# Patient Record
Sex: MALE | Race: WHITE | NOT HISPANIC OR LATINO | URBAN - METROPOLITAN AREA
[De-identification: names, ages, dates, MRNs, and addresses within clinical notes are randomized per-mention and may not be internally consistent; named-entity substitution may affect disease eponyms.]

---

## 2017-10-30 NOTE — PATIENT DISCUSSION
(H25.13) Age-related nuclear cataract, bilateral - Assesment : Examination revealed cataracts in both eyes. Examination revealed moderate senile nuclear cataract. Patient is functioning reasonably well with minor symptoms. - Plan : Monitor for changes.

## 2017-10-30 NOTE — PATIENT DISCUSSION
(W42.3697) Nonexudative age-related macular degeneration bilateral lyric - Assesment : Examination revealed AMD Dry. - Plan : Wear sunglasses when outdoors and eat green, leafy vegetables to maintain ocular health.   Return to clinic in six months for Macular Fundus Photos and Exam

## 2017-10-30 NOTE — PATIENT DISCUSSION
(H40.053) Ocular hypertension, bilateral - Assesment : Examination revealed OHT mild. Iop within normal range today, recommend observation,if it trends treatment will be needed. - Plan : Monitor for changes. Advised patient to call our office when decreased vision or increased eye pain.

## 2018-02-05 ENCOUNTER — IMPORTED ENCOUNTER (OUTPATIENT)
Dept: URBAN - METROPOLITAN AREA CLINIC 50 | Facility: CLINIC | Age: 68
End: 2018-02-05

## 2018-02-07 ENCOUNTER — IMPORTED ENCOUNTER (OUTPATIENT)
Dept: URBAN - METROPOLITAN AREA CLINIC 50 | Facility: CLINIC | Age: 68
End: 2018-02-07

## 2018-03-13 ENCOUNTER — IMPORTED ENCOUNTER (OUTPATIENT)
Dept: URBAN - METROPOLITAN AREA CLINIC 50 | Facility: CLINIC | Age: 68
End: 2018-03-13

## 2018-03-21 ENCOUNTER — IMPORTED ENCOUNTER (OUTPATIENT)
Dept: URBAN - METROPOLITAN AREA CLINIC 50 | Facility: CLINIC | Age: 68
End: 2018-03-21

## 2018-03-29 ENCOUNTER — IMPORTED ENCOUNTER (OUTPATIENT)
Dept: URBAN - METROPOLITAN AREA CLINIC 50 | Facility: CLINIC | Age: 68
End: 2018-03-29

## 2018-04-30 NOTE — PATIENT DISCUSSION
(E16.6361) Nonexudative age-related macular degeneration bilateral lyric - Assesment : Examination revealed AMD Dry.-MILD - Plan : Monitor for changes. Advised patient to call our office with decreased vision or increased distortion. Patient advised to check Amsler Grid regularly (once weekly or more) and use nutraceuticals such as AREDS 2 eye vitamins. Wear sunglasses when outdoors and eat green, leafy vegetables to maintain ocular health.

## 2018-04-30 NOTE — PATIENT DISCUSSION
(H35.363) Drusen (degenerative) of macula, bilateral - Assesment : Examination revealed Drusen. - FINE HARD DRUSEN CENTRAL MACULA - Plan : Monitor for changes. Advised patient to call our office with decreased vision or increased symptoms.

## 2018-04-30 NOTE — PATIENT DISCUSSION
(H25.13) Age-related nuclear cataract, bilateral - Assesment : Examination revealed cataracts in both eyes. OS>OD. PATIENT MAY PROCEED WITH CATARACT SURGERY AT ANYTIME. - Plan : Monitor for changes.

## 2018-11-08 NOTE — PATIENT DISCUSSION
(H25.13) Age-related nuclear cataract, bilateral - Assesment : Examination revealed cataracts in both eyes. OS>OD. PATIENT MAY PROCEED WITH CATARACT SURGERY AT ANYTIME. ADVISED PATIENT CATARACTS AND ARMD ARE CONTRIBUTING TO BLUR OU. - Plan : SX COUNSELING.

## 2018-11-08 NOTE — PATIENT DISCUSSION
(C79.1828) Nonexudative age-related macular degeneration bilateral lyric - Assesment : Examination revealed AMD Dry.-MILD  NO FLUID OR EDEMA OU TODAY  CENTRAL RPE CHANGES WITH HARD DRUSEN OU. - Plan : Monitor for changes. Advised patient to call our office with decreased vision or increased distortion. Patient advised to check Amsler Grid regularly (once weekly or more) and use nutraceuticals such as AREDS 2 eye vitamins.  Wear sunglasses when outdoors and eat green, leafy vegetables to maintain ocular health. 6 MONTH/ DILATION/ FUNDUS PHOTOS

## 2019-01-03 NOTE — PATIENT DISCUSSION
(T31.5550) Nonexudative age-related macular degeneration bilateral lyric - Assesment : Examination revealed AMD Dry.-MILD  CENTRAL RPE CHANGES WITH HARD DRUSEN OU. - Plan : Monitor for changes. Advised patient to call our office with decreased vision or increased distortion. Patient advised to check Amsler Grid regularly (once weekly or more) and use nutraceuticals such as AREDS 2 eye vitamins. Wear sunglasses when outdoors and eat green, leafy vegetables to maintain ocular health.  KEEP MAY DILATION/ FUNDUS PHOTOS

## 2019-01-03 NOTE — PATIENT DISCUSSION
(H25.13) Age-related nuclear cataract, bilateral - Assesment : Examination revealed cataracts in both eyes. OS>OD.  **H/O ZOSTER WITH IRITIS OD. 20+ YEARS AGO  ADVISED PATIENT CATARACTS AND ARMD ARE CONTRIBUTING TO BLUR OU. CURRENTLY WEARS GLASSES FULL TIME. PT IS ABLE TO SEE SOME READING MATERIAL WITHOUT GLASSES HOWEVER SHE NEVER TAKES GLASSES HER GLASSES OFF DESPITE ABILITY TO SEE THINGS UP CLOSE WITHOUT GLASSES. PATIENT STATED SHE WOULD LIKE TO FOCUS OU AT [address]. STRESSED TO PT SHE'S CURRENTLY NEARSIGHTED WHICH ALLOWS HER TO SEE UP CLOSE WITHOUT GLASSES. ADVISED PATIENT SHE WILL NEED GLASSES FOR NEAR IF FOCUSES OU AT [address]. ADVISED PATIENT SHE HAS ASTIGMATISM OU. RECOMMEND TORIC IOL OU TO CORRECT THE MAJORITY OF THE ASTIGMATISM TO MAXIMIZE THE VISION. IF DEFERS TORIC IOL OU SHE WILL NEED GLASSES FULL TIME TO CORRECT THE ASTIGMATISM AT [facility]. DO NOT RECOMMEND MFIOL SECONDARY TO ARMD OU. - Plan : Risks, Benefits and Alternatives were discussed with patient at length for Cataract Surgery. Visual symptoms are consistent with Cataract findings on examination and current refraction no longer provides satisfactory vision. Patient understands and desires surgery. All questions answered. Risks, Benefits and Alternatives discussed at length for IOL placement. Patient will need to wear glasses for READING. EYE: OD  IOL TYPE: TORIC  POST OPERATIVE TARGET: DISTANCE [place] TO -0.50 DR RECOMMENDED PACKAGE:  TORIC/ ASTIGMATISM  PT PREFERRED PACKAGE:  TORIC /ASTIGMATISM  OS TO POSSIBLY FOLLOW  ****EXTENDED GTT TX OD ONLY ******DUREZOL, PROLENSA AND BESIVANCE FOR OD ONLY ( USE PRED BROM AND BESIVANCE OS )  Patient to see surgery counselor today.  **START VALTREX 500MG  PO BID FOR 10 DAYS, START 1 DAY BEFORE SURGERY. (RX SENT IN - LW)

## 2019-02-14 NOTE — PATIENT DISCUSSION
(Z96.1) Presence of intraocular lens - Assesment : Patient is Pseudophakic. ORA was done in OR on operated eye. IOP slightly elevated 1 gtt Combigan at 0836, no breathing problems per patient  IOP AT 9:02 27     PATIENT CAN LIFT WEIGHTS AND AVOID ANY HEAD JARRING ACTIVITIES. DON'T SUBMERGE HEAD UNDER WATER. - Plan : Discussed signs and symptoms of infection and retinal detachments. Do not rub operated eye.  Follow drop schedule If redness,pain,decreased vision, flashes or floaters occur then contact clinic.  1 WEEK REFRACT TRAVATAN FOR 3 DAYS QD OD (SAMPLE GIVEN TO PATIENT)

## 2019-02-19 NOTE — PATIENT DISCUSSION
(H25.12) Age-related nuclear cataract, left eye - Assesment : Examination revealed cataracts OS **H/O ZOSTER WITH IRITIS OD. 20+ YEARS AGO  ADVISED PATIENT CATARACTS AND ARMD ARE CONTRIBUTING TO BLUR OU. CURRENTLY WEARS GLASSES FULL TIME. PT IS ABLE TO SEE SOME READING MATERIAL WITHOUT GLASSES HOWEVER SHE NEVER TAKES GLASSES HER GLASSES OFF DESPITE ABILITY TO SEE THINGS UP CLOSE WITHOUT GLASSES. STRESSED TO PT SHE'S CURRENTLY NEARSIGHTED WHICH ALLOWS HER TO SEE UP CLOSE WITHOUT GLASSES. ADVISED PATIENT SHE WILL NEED GLASSES FOR NEAR IF FOCUSES OU AT [employer] ADVISED PATIENT SHE HAS ASTIGMATISM OU. RECOMMEND TORIC IOL OU TO CORRECT THE MAJORITY OF THE ASTIGMATISM TO MAXIMIZE THE VISION. IF DEFERS TORIC IOL OU SHE WILL NEED GLASSES FULL TIME TO CORRECT THE ASTIGMATISM AT [place] [address]. DO NOT RECOMMEND MFIOL SECONDARY TO ARMD OU. OS SEES MORE NEAR. OD SEES DISTANCE. THE OPTION IS THERE TO KEEP SOME NEAR VISION OS. PATIENT HAS NO PROBLEMS DRIVING AT THIS TIME. SHE IS USING A MAGNIFYING GLASS TO READ. OPTION 1 MAKE OS DISTANCE OR OPTION 2 LEAVE SOME NEAR VISION AND THEN USE OTC GLASSES TO READ. MFIOL NOT RECOMMEND DUE TO ARMD AND THE MFIOL CAN GIVE HALOS. PATIENT FUNCTIONING WELL WITH BOTH EYES SO PLAN TO CARRY OVER SOME NEAR VISION OS AT THE TIME OF CATARACT SURGERY - Plan : Risks, Benefits and Alternatives were discussed with patient at length for Cataract Surgery. Visual symptoms are consistent with Cataract findings on examination and current refraction no longer provides satisfactory vision. Patient understands and desires surgery. All questions answered. Risks, Benefits and Alternatives discussed at length for IOL placement. Patient will need to wear glasses for READING.  **MINI MONOVISION**  **BORDERLINE ASTIGMATISM OS** WILL DO A FINAL READING AT THE TIME OF SURGERY TO SEE IF A TORIC LENS VS LRI WOULD BE NEEDED EYE: OS IOL TYPE: TORIC VS LRI  POST OPERATIVE TARGET:-1.50  RECOMMENDED PACKAGE:  TORIC VS LRI **ASTIGMATISM** PT PREFERRED PACKAGE:  TORIC /ASTIGMATISM   Patient to see surgery counselor today.

## 2019-02-28 NOTE — PATIENT DISCUSSION
(Z96.1) Presence of intraocular lens - Assesment : Patient is Pseudophakic. ORA done in OR during surgery IOP OS ELEVATED TODAY  1 GTT COMBIGAN OS  1 GTT TRAVATAN OS - Plan : Discussed signs and symptoms of infection and retinal detachments. Do not rub operated eye.  Follow drop schedule If redness,pain,decreased vision, flashes or floaters occur then contact clinic. 1 WEEK

## 2019-03-05 NOTE — PATIENT DISCUSSION
(Z96.1) Presence of intraocular lens - Assesment : Patient is Pseudophakic. NORMAL POST OP APPEARANCE - Plan : Signs and symptoms of infection and retinal detachment are outlined in your surgical packet. Do not rub operated eye. Follow drop schedule. If redness, pain, decreased vision, flashes or floaters occur then contact clinic. IF PATIENT RUNS OUT OF THE PRED BROM SHE CAN USE INVELTYS (SAMPLE GIVEN)  3 WEEKS REFRACTION/MAC OCT

## 2019-03-26 NOTE — PATIENT DISCUSSION
(Z96.1) Presence of intraocular lens - Assesment : Patient is Pseudophakic. - Plan : Signs and symptoms of infection and retinal detachment are outlined in your surgical packet. Do not rub operated eye. Follow drop schedule.  If redness, pain, decreased vision, flashes or floaters occur then contact clinic. 6 MONTH/ DILATION / FUNDUS PHOTOS

## 2019-09-30 NOTE — PATIENT DISCUSSION
Discussed that opacity is the cause of the decreased vision. Discussed the risks and benefits of performing a YAG Capsulotomy including the risk of floaters, retinal detachment, and retinal swelling.

## 2019-09-30 NOTE — PATIENT DISCUSSION
Monitor for changes. Advised patient to call our office when decreased vision or increased eye pain.

## 2019-09-30 NOTE — PATIENT DISCUSSION
Patient understands to expect floaters after the YAG capsulotomy procedures and understands that they may remain indefinitely. Recommend that patient undergo a YAG Capsulotomy OD (Right Eye) then OS (Left Eye).

## 2019-09-30 NOTE — PATIENT DISCUSSION
(L60.324) Other secondary cataract, bilateral - Assesment : Posterior capsule opacification present. Patient is symptomatic with visual function affected. - Plan : Discussed that opacity is the cause of the decreased vision. Discussed the risks and benefits of performing a YAG Capsulotomy including the risk of floaters, retinal detachment, and retinal swelling. Patient understands to expect floaters after the YAG capsulotomy procedures and understands that they may remain indefinitely. Recommend that patient undergo a YAG Capsulotomy OD (Right Eye) then OS (Left Eye).

## 2019-09-30 NOTE — PATIENT DISCUSSION
Monitor for changes. Advised patient to call our office with decreased vision or increased distortion. Patient advised to check Amsler Grid regularly (once weekly or more) and use nutraceuticals such as AREDS 2 eye vitamins. Wear sunglasses when outdoors and eat green, leafy vegetables to maintain ocular health.

## 2019-09-30 NOTE — PATIENT DISCUSSION
Monitor for changes. Advised patient to call our office with decreased vision or increased symptoms.  Artificial tears prescribed to be used 3 to 5 times per day. AT samples given.

## 2019-09-30 NOTE — PATIENT DISCUSSION
(C52.5825) Nonexudative age-related macular degeneration bilateral lyric - Assesment : Examination revealed AMD Dry OU - mild. (+) FHx of AMD - father. Central RPE changes and drusen OU. - Plan : Monitor for changes. Advised patient to call our office with decreased vision or increased distortion. Patient advised to check Amsler Grid regularly (once weekly or more) and use nutraceuticals such as AREDS 2 eye vitamins. Wear sunglasses when outdoors and eat green, leafy vegetables to maintain ocular health. RV 2 week following yag cap then 6 months follow up/MAC OCT.

## 2019-12-13 ENCOUNTER — IMPORTED ENCOUNTER (OUTPATIENT)
Dept: URBAN - METROPOLITAN AREA CLINIC 50 | Facility: CLINIC | Age: 69
End: 2019-12-13

## 2020-02-19 ENCOUNTER — IMPORTED ENCOUNTER (OUTPATIENT)
Dept: URBAN - METROPOLITAN AREA CLINIC 50 | Facility: CLINIC | Age: 70
End: 2020-02-19

## 2020-02-20 ENCOUNTER — IMPORTED ENCOUNTER (OUTPATIENT)
Dept: URBAN - METROPOLITAN AREA CLINIC 50 | Facility: CLINIC | Age: 70
End: 2020-02-20

## 2020-03-05 ENCOUNTER — IMPORTED ENCOUNTER (OUTPATIENT)
Dept: URBAN - METROPOLITAN AREA CLINIC 50 | Facility: CLINIC | Age: 70
End: 2020-03-05

## 2020-03-30 ENCOUNTER — IMPORTED ENCOUNTER (OUTPATIENT)
Dept: URBAN - METROPOLITAN AREA CLINIC 50 | Facility: CLINIC | Age: 70
End: 2020-03-30

## 2020-05-08 NOTE — PATIENT DISCUSSION
Discussed AREDS supplements, BP Control, and dark leafy green vegetables. prescription called to pharmacy

## 2020-06-11 ENCOUNTER — IMPORTED ENCOUNTER (OUTPATIENT)
Dept: URBAN - METROPOLITAN AREA CLINIC 50 | Facility: CLINIC | Age: 70
End: 2020-06-11

## 2020-06-22 ENCOUNTER — IMPORTED ENCOUNTER (OUTPATIENT)
Dept: URBAN - METROPOLITAN AREA CLINIC 50 | Facility: CLINIC | Age: 70
End: 2020-06-22

## 2020-06-30 ENCOUNTER — IMPORTED ENCOUNTER (OUTPATIENT)
Dept: URBAN - METROPOLITAN AREA CLINIC 50 | Facility: CLINIC | Age: 70
End: 2020-06-30

## 2020-07-06 ENCOUNTER — IMPORTED ENCOUNTER (OUTPATIENT)
Dept: URBAN - METROPOLITAN AREA CLINIC 50 | Facility: CLINIC | Age: 70
End: 2020-07-06

## 2020-07-06 NOTE — PATIENT DISCUSSION
"""S/P IOL OD: Tecnis [id number] +18.5 @ 180Âº (Target: [place]) +Femto/Arcs +Omidria. Continue post operative instructions and drops per schedule.  """

## 2020-07-14 ENCOUNTER — IMPORTED ENCOUNTER (OUTPATIENT)
Dept: URBAN - METROPOLITAN AREA CLINIC 50 | Facility: CLINIC | Age: 70
End: 2020-07-14

## 2020-07-14 NOTE — PATIENT DISCUSSION
"""S/P IOL OS: Tecnis [id number] +19.5 @ 180Âº (Target: [place]) +Femto/Arcs +Omidria. Continue post operative instructions and drops per schedule.  """

## 2020-07-20 ENCOUNTER — IMPORTED ENCOUNTER (OUTPATIENT)
Dept: URBAN - METROPOLITAN AREA CLINIC 50 | Facility: CLINIC | Age: 70
End: 2020-07-20

## 2020-08-03 ENCOUNTER — IMPORTED ENCOUNTER (OUTPATIENT)
Dept: URBAN - METROPOLITAN AREA CLINIC 50 | Facility: CLINIC | Age: 70
End: 2020-08-03

## 2020-08-03 NOTE — PATIENT DISCUSSION
"""S/P IOL OU: OD: Tecnis [id number] +18.5 @ 180Âº (Target: [place])Femto/ArcsOmidria.  OS: Tecfaiza ""

## 2020-08-11 ENCOUNTER — IMPORTED ENCOUNTER (OUTPATIENT)
Dept: URBAN - METROPOLITAN AREA CLINIC 50 | Facility: CLINIC | Age: 70
End: 2020-08-11

## 2020-08-24 ENCOUNTER — IMPORTED ENCOUNTER (OUTPATIENT)
Dept: URBAN - METROPOLITAN AREA CLINIC 50 | Facility: CLINIC | Age: 70
End: 2020-08-24

## 2020-08-24 NOTE — PATIENT DISCUSSION
"""Pt had motility check on 8/11/2020 and has Rx to fill with prism to correct the double vision. """

## 2020-09-16 ENCOUNTER — IMPORTED ENCOUNTER (OUTPATIENT)
Dept: URBAN - METROPOLITAN AREA CLINIC 50 | Facility: CLINIC | Age: 70
End: 2020-09-16

## 2020-09-24 ENCOUNTER — IMPORTED ENCOUNTER (OUTPATIENT)
Dept: URBAN - METROPOLITAN AREA CLINIC 50 | Facility: CLINIC | Age: 70
End: 2020-09-24

## 2020-09-24 NOTE — PATIENT DISCUSSION
"""Patient able to fuse with decrease in horiztonal prism. Final glasses Rx given.  Advsied to rtc ""

## 2020-12-21 ENCOUNTER — IMPORTED ENCOUNTER (OUTPATIENT)
Dept: URBAN - METROPOLITAN AREA CLINIC 50 | Facility: CLINIC | Age: 70
End: 2020-12-21

## 2021-04-17 ASSESSMENT — VISUAL ACUITY
OD_CC: 20/30
OD_SC: 20/25
OD_BAT: 20/40
OS_CC: 20/30
OD_CC: 20/20
OS_SC: 20/25+-
OS_SC: 20/30
OS_SC: 20/25
OD_SC: 20/30
OS_BAT: 20/40
OS_CC: 20/25-2
OD_CC: J1+@ 14 IN
OS_SC: 20/30-1
OD_OTHER: 20/30. 20/400.
OD_CC: 20/30
OD_CC: J1+
OS_CC: 20/20-1
OD_SC: 20/25-1
OS_PH: 20/25-2
OS_BAT: 20/40
OD_OTHER: 20/30. 20/400.
OS_CC: 20/30
OS_OTHER: 20/40. 20/40.
OD_SC: 20/25-2
OD_SC: 20/20-
OS_CC: J1+@ 22 IN
OS_OTHER: 20/20.
OD_PH: @ 16 IN
OS_CC: J1
OS_OTHER: 20/40. 20/40.
OD_BAT: 20/30
OD_BAT: 20/30
OD_OTHER: 20/40. 20/50.
OS_BAT: 20/30
OD_CC: J1@ 17 IN
OD_OTHER: 20/20. 20/25.
OS_OTHER: 20/40. 20/60.
OD_CC: 20/30-1
OS_CC: 20/20
OS_CC: 20/80
OD_CC: 20/25-2
OD_CC: J1+@ 22 IN
OS_CC: 20/30-1
OD_CC: 20/30
OS_SC: 20/50+2
OD_SC: 20/25
OS_CC: J1+@ 14 IN
OS_CC: J1@ 17 IN
OS_CC: J1+
OS_SC: 20/20-
OS_BAT: 20/40
OS_OTHER: 20/30. 20/40.
OS_PH: 20/30+2
OD_SC: 20/200
OD_BAT: 20/20
OD_CC: J1

## 2021-04-17 ASSESSMENT — TONOMETRY
OD_IOP_MMHG: 13
OS_IOP_MMHG: 13
OD_IOP_MMHG: 14
OS_IOP_MMHG: 23
OS_IOP_MMHG: 16
OS_IOP_MMHG: 14
OD_IOP_MMHG: 13
OS_IOP_MMHG: 16
OS_IOP_MMHG: 15
OS_IOP_MMHG: 15
OD_IOP_MMHG: 16
OD_IOP_MMHG: 15
OD_IOP_MMHG: 13
OS_IOP_MMHG: 14
OS_IOP_MMHG: 15
OD_IOP_MMHG: 15
OD_IOP_MMHG: 15
OD_IOP_MMHG: 30

## 2021-06-09 ENCOUNTER — PREPPED CHART (OUTPATIENT)
Dept: URBAN - METROPOLITAN AREA CLINIC 49 | Facility: CLINIC | Age: 71
End: 2021-06-09

## 2021-06-10 ENCOUNTER — PROBLEM (OUTPATIENT)
Dept: URBAN - METROPOLITAN AREA CLINIC 49 | Facility: CLINIC | Age: 71
End: 2021-06-10

## 2021-06-10 DIAGNOSIS — H43.813: ICD-10-CM

## 2021-06-10 PROCEDURE — 92014 COMPRE OPH EXAM EST PT 1/>: CPT

## 2021-06-10 PROCEDURE — 92134 CPTRZ OPH DX IMG PST SGM RTA: CPT

## 2021-06-10 ASSESSMENT — TONOMETRY
OD_IOP_MMHG: 13
OS_IOP_MMHG: 14

## 2021-06-10 ASSESSMENT — VISUAL ACUITY
OD_CC: 20/30-1
OS_SC: 20/25-1
OS_CC: 20/30-3
OD_SC: 20/20-1

## 2021-06-10 NOTE — PATIENT DISCUSSION
Retinal tear and detachment warning symptoms reviewed and patient instructed to call immediately if increasing floaters, flashes, or decreasing peripheral vision. Patient advised to limit strenuous activity for 1 week.

## 2021-06-16 ENCOUNTER — 1 WEEK FOLLOW-UP (OUTPATIENT)
Dept: URBAN - METROPOLITAN AREA CLINIC 49 | Facility: CLINIC | Age: 71
End: 2021-06-16

## 2021-06-16 DIAGNOSIS — H43.812: ICD-10-CM

## 2021-06-16 PROCEDURE — 92014 COMPRE OPH EXAM EST PT 1/>: CPT

## 2021-06-16 ASSESSMENT — TONOMETRY
OD_IOP_MMHG: 14
OS_IOP_MMHG: 14

## 2021-06-16 ASSESSMENT — VISUAL ACUITY
OS_CC: 20/25+2
OU_CC: J1
OD_CC: 20/30

## 2021-10-28 NOTE — PATIENT DISCUSSION
Continue AREDS2 vitamins; recommend daily Amsler grid use (copy of grid given); discussed no smoking or second-hand smoke.

## 2022-07-07 ENCOUNTER — COMPREHENSIVE EXAM (OUTPATIENT)
Dept: URBAN - METROPOLITAN AREA CLINIC 49 | Facility: CLINIC | Age: 72
End: 2022-07-07

## 2022-07-07 DIAGNOSIS — H53.2: ICD-10-CM

## 2022-07-07 DIAGNOSIS — H26.493: ICD-10-CM

## 2022-07-07 DIAGNOSIS — H43.812: ICD-10-CM

## 2022-07-07 PROCEDURE — 92134 CPTRZ OPH DX IMG PST SGM RTA: CPT

## 2022-07-07 PROCEDURE — 92014 COMPRE OPH EXAM EST PT 1/>: CPT

## 2022-07-07 ASSESSMENT — TONOMETRY
OD_IOP_MMHG: 13
OS_IOP_MMHG: 15

## 2022-07-07 NOTE — PATIENT DISCUSSION
VSP was not used today because patient has prims and PCO. Will use VSP once capsulectomy is done and motility specialist evaluates.

## 2022-07-13 ENCOUNTER — FOLLOW UP (OUTPATIENT)
Dept: URBAN - METROPOLITAN AREA CLINIC 49 | Facility: CLINIC | Age: 72
End: 2022-07-13

## 2022-07-13 DIAGNOSIS — H26.493: ICD-10-CM

## 2022-07-13 DIAGNOSIS — H53.2: ICD-10-CM

## 2022-07-13 PROCEDURE — 92012 INTRM OPH EXAM EST PATIENT: CPT

## 2022-07-13 ASSESSMENT — VISUAL ACUITY
OD_CC: 20/30-1
OD_GLARE: 20/30
OU_CC: 20/25
OD_PH: 20/25
OD_GLARE: 20/30
OS_GLARE: 20/50
OS_GLARE: 20/40
OS_CC: 20/30-1

## 2022-07-13 ASSESSMENT — TONOMETRY
OD_IOP_MMHG: 15
OS_IOP_MMHG: 14

## 2022-07-13 NOTE — PATIENT DISCUSSION
Patient has prism in glasses. Has intermittent diplopia. Recommend patient to  schedule next available Motility check.

## 2022-07-13 NOTE — PATIENT DISCUSSION
Patient not bothered by glare. Will monitor at this time. Advised to call if glare becomes bothersome in the future.

## 2022-09-14 ENCOUNTER — CONTACT LENSES/GLASSES VISIT (OUTPATIENT)
Dept: URBAN - METROPOLITAN AREA CLINIC 48 | Facility: LOCATION | Age: 72
End: 2022-09-14

## 2022-09-14 DIAGNOSIS — H53.2: ICD-10-CM

## 2022-09-14 DIAGNOSIS — H52.4: ICD-10-CM

## 2022-09-14 PROCEDURE — 92060 SENSORIMOTOR EXAMINATION: CPT

## 2022-09-14 ASSESSMENT — VISUAL ACUITY
OU_CC: J1+/-
OS_CC: 20/25-2
OU_CC: 20/20-1
OD_CC: 20/20-1

## 2022-09-14 NOTE — PATIENT DISCUSSION
Neither adding more prism or less prism resolves patient's diplopia. Explained to patient that in some cases a patient might still have some diplopia that cannot be corrected.

## 2022-09-14 NOTE — PATIENT DISCUSSION
Recommended patient check with Optical to see if adjusting the focal point for distance and near will help patient read better with current Progressive glasses.

## 2022-10-18 NOTE — PATIENT DISCUSSION
Discussed AREDS supplements, BP Control, and dark leafy green vegetables. Start taking procardia PO pill once a day for high blood pressure and follow up with Dr Ember Napoles in her office in one week.

## 2023-08-31 ENCOUNTER — COMPREHENSIVE EXAM (OUTPATIENT)
Dept: URBAN - METROPOLITAN AREA CLINIC 49 | Facility: LOCATION | Age: 73
End: 2023-08-31

## 2023-08-31 DIAGNOSIS — H43.812: ICD-10-CM

## 2023-08-31 DIAGNOSIS — H52.4: ICD-10-CM

## 2023-08-31 DIAGNOSIS — Z01.01: ICD-10-CM

## 2023-08-31 DIAGNOSIS — H53.2: ICD-10-CM

## 2023-08-31 DIAGNOSIS — H02.831: ICD-10-CM

## 2023-08-31 DIAGNOSIS — H02.834: ICD-10-CM

## 2023-08-31 DIAGNOSIS — H26.493: ICD-10-CM

## 2023-08-31 PROCEDURE — 92014 COMPRE OPH EXAM EST PT 1/>: CPT

## 2023-08-31 PROCEDURE — 92015 DETERMINE REFRACTIVE STATE: CPT

## 2023-08-31 ASSESSMENT — TONOMETRY
OD_IOP_MMHG: 13
OS_IOP_MMHG: 12

## 2023-08-31 ASSESSMENT — VISUAL ACUITY
OD_GLARE: 20/30
OU_CC: 20/25+2
OD_GLARE: 20/25
OS_GLARE: 20/40
OD_CC: 20/30-1
OS_PH: 20/30
OS_CC: 20/40-2
OS_GLARE: 20/50

## 2024-09-04 ENCOUNTER — COMPREHENSIVE EXAM (OUTPATIENT)
Dept: URBAN - METROPOLITAN AREA CLINIC 49 | Facility: LOCATION | Age: 74
End: 2024-09-04

## 2024-09-04 DIAGNOSIS — Z01.01: ICD-10-CM

## 2024-09-04 DIAGNOSIS — H26.493: ICD-10-CM

## 2024-09-04 DIAGNOSIS — H43.813: ICD-10-CM

## 2024-09-04 DIAGNOSIS — H53.2: ICD-10-CM

## 2024-09-04 DIAGNOSIS — H02.834: ICD-10-CM

## 2024-09-04 DIAGNOSIS — H02.831: ICD-10-CM

## 2024-09-04 PROCEDURE — 92014 COMPRE OPH EXAM EST PT 1/>: CPT

## 2024-09-04 PROCEDURE — 92015 DETERMINE REFRACTIVE STATE: CPT
